# Patient Record
Sex: FEMALE | Race: ASIAN | URBAN - METROPOLITAN AREA
[De-identification: names, ages, dates, MRNs, and addresses within clinical notes are randomized per-mention and may not be internally consistent; named-entity substitution may affect disease eponyms.]

---

## 2018-07-30 ENCOUNTER — OFFICE VISIT (OUTPATIENT)
Dept: ENDOCRINOLOGY CLINIC | Facility: CLINIC | Age: 28
End: 2018-07-30
Payer: COMMERCIAL

## 2018-07-30 VITALS
BODY MASS INDEX: 19.32 KG/M2 | HEART RATE: 94 BPM | WEIGHT: 105 LBS | SYSTOLIC BLOOD PRESSURE: 112 MMHG | DIASTOLIC BLOOD PRESSURE: 73 MMHG | HEIGHT: 62 IN

## 2018-07-30 DIAGNOSIS — E06.3 HASHIMOTO'S DISEASE: Primary | ICD-10-CM

## 2018-07-30 PROCEDURE — 99203 OFFICE O/P NEW LOW 30 MIN: CPT | Performed by: INTERNAL MEDICINE

## 2018-07-30 PROCEDURE — 99212 OFFICE O/P EST SF 10 MIN: CPT | Performed by: INTERNAL MEDICINE

## 2018-07-30 RX ORDER — THYROID,PORK 32.5 MG
TABLET ORAL
Refills: 1 | COMMUNITY
Start: 2018-07-19 | End: 2018-07-30

## 2018-07-30 NOTE — H&P
New Patient Evaluation - History and Physical    CONSULT - Reason for Visit:  Evaluation of thyroid function  Requesting Physician: Self referral    CHIEF COMPLAINT:    Evaluation of thyroid function    HISTORY OF PRESENT ILLNESS:   Heidi Temple is a 33 edema  Endocrine: Negative for: polyuria, polydypsia  Skin: + dry skin      PHYSICAL EXAM:    07/30/18  1432   BP: 112/73   Pulse: 94   Weight: 105 lb (47.6 kg)   Height: 5' 2\" (1.575 m)     BMI: Body mass index is 19.2 kg/m².      CONSTITUTIONAL:  awake,

## (undated) NOTE — LETTER
7/29/2019              Megumi Sheree Burkitt        [address]         Dear Maria De Jesus Tamez records indicate that the tests ordered for you by Laurelyn Spatz, MD  have not been done.   If you have, in fact, already